# Patient Record
Sex: FEMALE | Race: WHITE | URBAN - METROPOLITAN AREA
[De-identification: names, ages, dates, MRNs, and addresses within clinical notes are randomized per-mention and may not be internally consistent; named-entity substitution may affect disease eponyms.]

---

## 2020-09-17 ENCOUNTER — HOSPITAL ENCOUNTER (OUTPATIENT)
Age: 40
Setting detail: SPECIMEN
Discharge: HOME | End: 2020-09-17
Payer: COMMERCIAL

## 2020-09-17 LAB
GLUCOSE ADMINISTRATION: NORMAL
GLUCOSE TOLERANCE SCREEN 50G: 121 MG/DL (ref 70–135)
HCT VFR BLD CALC: 34.4 % (ref 36.3–47.1)
HEMOGLOBIN: 11.2 G/DL (ref 11.9–15.1)
MCH RBC QN AUTO: 29.4 PG (ref 25.2–33.5)
MCHC RBC AUTO-ENTMCNC: 32.6 G/DL (ref 28.4–34.8)
MCV RBC AUTO: 90.3 FL (ref 82.6–102.9)
NRBC AUTOMATED: 0 PER 100 WBC
PDW BLD-RTO: 13.4 % (ref 11.8–14.4)
PLATELET # BLD: 160 K/UL (ref 138–453)
PMV BLD AUTO: 11.2 FL (ref 8.1–13.5)
RBC # BLD: 3.81 M/UL (ref 3.95–5.11)
WBC # BLD: 8.6 K/UL (ref 3.5–11.3)

## 2020-09-25 ENCOUNTER — HOSPITAL ENCOUNTER (OUTPATIENT)
Age: 40
Setting detail: SPECIMEN
Discharge: HOME | End: 2020-09-25
Payer: COMMERCIAL

## 2020-09-25 LAB — ANTIBODY SCREEN: NEGATIVE

## 2023-08-02 ENCOUNTER — OFFICE VISIT (OUTPATIENT)
Dept: PRIMARY CARE CLINIC | Age: 43
End: 2023-08-02
Payer: COMMERCIAL

## 2023-08-02 VITALS
OXYGEN SATURATION: 98 % | WEIGHT: 133 LBS | HEART RATE: 81 BPM | HEIGHT: 66 IN | DIASTOLIC BLOOD PRESSURE: 84 MMHG | SYSTOLIC BLOOD PRESSURE: 134 MMHG | RESPIRATION RATE: 10 BRPM | BODY MASS INDEX: 21.38 KG/M2

## 2023-08-02 DIAGNOSIS — Z13.220 SCREENING FOR LIPID DISORDERS: ICD-10-CM

## 2023-08-02 DIAGNOSIS — Z00.00 ENCOUNTER FOR GENERAL ADULT MEDICAL EXAMINATION W/O ABNORMAL FINDINGS: Primary | ICD-10-CM

## 2023-08-02 DIAGNOSIS — Z13.29 SCREENING FOR THYROID DISORDER: ICD-10-CM

## 2023-08-02 DIAGNOSIS — Z13.1 SCREENING FOR DIABETES MELLITUS: ICD-10-CM

## 2023-08-02 DIAGNOSIS — Z13.0 SCREENING FOR DEFICIENCY ANEMIA: ICD-10-CM

## 2023-08-02 PROCEDURE — 99386 PREV VISIT NEW AGE 40-64: CPT | Performed by: NURSE PRACTITIONER

## 2023-08-02 SDOH — ECONOMIC STABILITY: FOOD INSECURITY: WITHIN THE PAST 12 MONTHS, THE FOOD YOU BOUGHT JUST DIDN'T LAST AND YOU DIDN'T HAVE MONEY TO GET MORE.: NEVER TRUE

## 2023-08-02 SDOH — ECONOMIC STABILITY: HOUSING INSECURITY
IN THE LAST 12 MONTHS, WAS THERE A TIME WHEN YOU DID NOT HAVE A STEADY PLACE TO SLEEP OR SLEPT IN A SHELTER (INCLUDING NOW)?: NO

## 2023-08-02 SDOH — ECONOMIC STABILITY: INCOME INSECURITY: HOW HARD IS IT FOR YOU TO PAY FOR THE VERY BASICS LIKE FOOD, HOUSING, MEDICAL CARE, AND HEATING?: NOT HARD AT ALL

## 2023-08-02 SDOH — ECONOMIC STABILITY: FOOD INSECURITY: WITHIN THE PAST 12 MONTHS, YOU WORRIED THAT YOUR FOOD WOULD RUN OUT BEFORE YOU GOT MONEY TO BUY MORE.: NEVER TRUE

## 2023-08-02 ASSESSMENT — PATIENT HEALTH QUESTIONNAIRE - PHQ9
2. FEELING DOWN, DEPRESSED OR HOPELESS: 0
SUM OF ALL RESPONSES TO PHQ QUESTIONS 1-9: 0
SUM OF ALL RESPONSES TO PHQ QUESTIONS 1-9: 0
1. LITTLE INTEREST OR PLEASURE IN DOING THINGS: 0
SUM OF ALL RESPONSES TO PHQ QUESTIONS 1-9: 0
SUM OF ALL RESPONSES TO PHQ QUESTIONS 1-9: 0
SUM OF ALL RESPONSES TO PHQ9 QUESTIONS 1 & 2: 0

## 2023-08-02 ASSESSMENT — ENCOUNTER SYMPTOMS
ABDOMINAL PAIN: 0
COUGH: 0
SHORTNESS OF BREATH: 0
BACK PAIN: 0

## 2023-08-02 NOTE — PROGRESS NOTES
[address]  PRIMARY CARE  [place] [id number] [address] [address]  Dept: [phone]  Dept Fax: [phone]    Toni Cadena is a 38 y.o. female who presentstoday for her medical conditions/complaints as noted below. Toni Cadena is c/o of  Chief Complaint   Patient presents with    Establish Care           Blood Pressure Check     BP is higher than normal            HPI:     Presents for new patient visit/est care  BP well controlled in office  Monitoring at home with arm cuff, 130s/80s  Will continue to monitor. Asymptomatic with readings  BMI well controlled  Has two sons, ages 11 and 2  Had to monitor BP during pregnancy but did not use medication  Was induced early for second pregnancy d/t elevated BP    Follows with GYN  Will schedule annual for pap and mammogram    Willing to update annual labs    Denies any other problems/concerns      No results found for: LABA1C          ( goal A1C is < 7)   No components found for: LABMICR  No results found for: LDLCHOLESTEROL, LDLCALC    (goal LDL is <100)   No results found for: AST, ALT, BUN, CR  BP Readings from Last 3 Encounters:   08/02/23 134/84          ([id number])    History reviewed. No pertinent past medical history. History reviewed. No pertinent surgical history. Family History   Problem Relation Age of Onset    High Blood Pressure Mother     High Blood Pressure Father           Social History     Tobacco Use    Smoking status: Never     Passive exposure: Never    Smokeless tobacco: Never   Substance Use Topics    Alcohol use: Never      Current Outpatient Medications   Medication Sig Dispense Refill    Multiple Vitamin (MULTIVITAMIN ADULT PO) Take by mouth       No current facility-administered medications for this visit.      No Known Allergies    Health Maintenance   Topic Date Due    HIV screen  Never done    Hepatitis C screen  Never done    Cervical cancer screen  Never done    COVID-19 Vaccine (3 -

## 2025-02-05 ENCOUNTER — OFFICE VISIT (OUTPATIENT)
Dept: PRIMARY CARE CLINIC | Age: 45
End: 2025-02-05
Payer: COMMERCIAL

## 2025-02-05 ENCOUNTER — HOSPITAL ENCOUNTER (OUTPATIENT)
Age: 45
Setting detail: SPECIMEN
Discharge: HOME | End: 2025-02-05

## 2025-02-05 VITALS
BODY MASS INDEX: 22.5 KG/M2 | HEART RATE: 108 BPM | DIASTOLIC BLOOD PRESSURE: 92 MMHG | HEIGHT: 66 IN | OXYGEN SATURATION: 99 % | WEIGHT: 140 LBS | SYSTOLIC BLOOD PRESSURE: 162 MMHG

## 2025-02-05 DIAGNOSIS — I10 ESSENTIAL HYPERTENSION: Primary | ICD-10-CM

## 2025-02-05 DIAGNOSIS — Z87.59 HISTORY OF GESTATIONAL HYPERTENSION: ICD-10-CM

## 2025-02-05 DIAGNOSIS — Z78.9 NEVER SMOKED TOBACCO: ICD-10-CM

## 2025-02-05 DIAGNOSIS — I10 ESSENTIAL HYPERTENSION: ICD-10-CM

## 2025-02-05 PROBLEM — R03.0 ELEVATED BP WITHOUT DIAGNOSIS OF HYPERTENSION: Status: ACTIVE | Noted: 2025-02-05

## 2025-02-05 LAB
ANION GAP SERPL CALCULATED.3IONS-SCNC: 10 MMOL/L (ref 9–16)
BUN SERPL-MCNC: 12 MG/DL (ref 6–20)
CALCIUM SERPL-MCNC: 9.5 MG/DL (ref 8.6–10.4)
CHLORIDE SERPL-SCNC: 101 MMOL/L (ref 98–107)
CHOLEST SERPL-MCNC: 205 MG/DL (ref 0–199)
CHOLESTEROL/HDL RATIO: 2.6
CO2 SERPL-SCNC: 29 MMOL/L (ref 20–31)
CREAT SERPL-MCNC: 0.6 MG/DL (ref 0.6–0.9)
CREAT UR-MCNC: 79.1 MG/DL (ref 28–217)
ERYTHROCYTE [DISTWIDTH] IN BLOOD BY AUTOMATED COUNT: 15.6 % (ref 11.8–14.4)
GFR, ESTIMATED: >90 ML/MIN/1.73M2
GLUCOSE SERPL-MCNC: 97 MG/DL (ref 74–99)
HCT VFR BLD AUTO: 40.8 % (ref 36.3–47.1)
HDLC SERPL-MCNC: 79 MG/DL
HGB BLD-MCNC: 12.3 G/DL (ref 11.9–15.1)
LDLC SERPL CALC-MCNC: 103 MG/DL (ref 0–100)
MCH RBC QN AUTO: 23.7 PG (ref 25.2–33.5)
MCHC RBC AUTO-ENTMCNC: 30.1 G/DL (ref 28.4–34.8)
MCV RBC AUTO: 78.5 FL (ref 82.6–102.9)
MICROALBUMIN UR-MCNC: <12 MG/L (ref 0–20)
MICROALBUMIN/CREAT UR-RTO: NORMAL MCG/MG CREAT (ref 0–25)
NRBC BLD-RTO: 0 PER 100 WBC
PLATELET # BLD AUTO: 227 K/UL (ref 138–453)
PMV BLD AUTO: 11.1 FL (ref 8.1–13.5)
POTASSIUM SERPL-SCNC: 3.6 MMOL/L (ref 3.7–5.3)
RBC # BLD AUTO: 5.2 M/UL (ref 3.95–5.11)
SODIUM SERPL-SCNC: 140 MMOL/L (ref 136–145)
TRIGL SERPL-MCNC: 117 MG/DL (ref 0–149)
VLDLC SERPL CALC-MCNC: 23 MG/DL (ref 1–30)
WBC OTHER # BLD: 6 K/UL (ref 3.5–11.3)

## 2025-02-05 PROCEDURE — 3077F SYST BP >= 140 MM HG: CPT | Performed by: STUDENT IN AN ORGANIZED HEALTH CARE EDUCATION/TRAINING PROGRAM

## 2025-02-05 PROCEDURE — 99204 OFFICE O/P NEW MOD 45 MIN: CPT | Performed by: STUDENT IN AN ORGANIZED HEALTH CARE EDUCATION/TRAINING PROGRAM

## 2025-02-05 PROCEDURE — 3079F DIAST BP 80-89 MM HG: CPT | Performed by: STUDENT IN AN ORGANIZED HEALTH CARE EDUCATION/TRAINING PROGRAM

## 2025-02-05 PROCEDURE — 93000 ELECTROCARDIOGRAM COMPLETE: CPT | Performed by: STUDENT IN AN ORGANIZED HEALTH CARE EDUCATION/TRAINING PROGRAM

## 2025-02-05 RX ORDER — VALSARTAN 80 MG/1
80 TABLET ORAL DAILY
Qty: 30 TABLET | Refills: 0 | Status: SHIPPED | OUTPATIENT
Start: 2025-02-05

## 2025-02-05 RX ORDER — AMLODIPINE BESYLATE 5 MG/1
5 TABLET ORAL DAILY
Qty: 30 TABLET | Refills: 0 | Status: SHIPPED | OUTPATIENT
Start: 2025-02-05

## 2025-02-05 SDOH — ECONOMIC STABILITY: FOOD INSECURITY: WITHIN THE PAST 12 MONTHS, THE FOOD YOU BOUGHT JUST DIDN'T LAST AND YOU DIDN'T HAVE MONEY TO GET MORE.: NEVER TRUE

## 2025-02-05 SDOH — ECONOMIC STABILITY: FOOD INSECURITY: WITHIN THE PAST 12 MONTHS, YOU WORRIED THAT YOUR FOOD WOULD RUN OUT BEFORE YOU GOT MONEY TO BUY MORE.: NEVER TRUE

## 2025-02-05 ASSESSMENT — PATIENT HEALTH QUESTIONNAIRE - PHQ9
2. FEELING DOWN, DEPRESSED OR HOPELESS: NOT AT ALL
SUM OF ALL RESPONSES TO PHQ QUESTIONS 1-9: 0
1. LITTLE INTEREST OR PLEASURE IN DOING THINGS: NOT AT ALL
SUM OF ALL RESPONSES TO PHQ QUESTIONS 1-9: 0
SUM OF ALL RESPONSES TO PHQ9 QUESTIONS 1 & 2: 0

## 2025-02-05 NOTE — ASSESSMENT & PLAN NOTE
- EKG normal  - Prescribed new blood pressure cuff  - Advised to monitor blood pressure at home 3 times a week and maintain a log  - HTN lab panel ordered today  - Instructed to bring blood pressure log and cuff to next appointment for accuracy verification  - started valsartan, amlodipine today

## 2025-02-05 NOTE — PATIENT INSTRUCTIONS
If you plan on taking your medications at night, you can take one tablet of Valsartan as soon as you  the prescription. Then you can take both the amlodipine  and valsartan at night.     If you plan on taking the meds in the morning, just take both as soon as you get the prescriptions.     Hypertension  If you've been asked to keep track of your blood pressure at home, make sure you are measuring your blood pressure at the same time of day every day you measure it. Blood pressure follows a natural variation every day; it starts off low, rises throughout the day until midday, and then starts to drop again in the late afternoon/evening. It doesn't matter when you measure your blood pressure as long as you measure it at the same time of day each time, so take that into consideration when trying to determine when you want to check your blood pressure.    If you're looking for a new blood pressure cuff, check out the website validateBP.org to find a quality cuff    Some things you can do to reduce your high blood pressure:  Reach and maintain a healthy weight. Every little bit counts; a 5-10% drop in your weight can result in improvement in your blood pressure.  Eat a heart-healthy diet that includes vegetables, fruits, whole grains, low-fat dairy products, poultry, fish, legumes, non-tropical vegetable oils and nuts. Your diet should also limit sodium, sweets, saturated fats, sugar sweetened beverages and red meats. For more information, you can look into the Mediterranean Diet and the DASH (dietary approaches to stop hypertension) Diet.  Be more physically active. Aim for 150 minutes of moderate exercise (with moderate exercise, you should be able to carry a conversation but not able to sing a song).  Don’t smoke and avoid secondhand smoke.  Limit alcohol to no more than one drink per day for women or two drinks a day for men.  Take your medication as prescribed.  Know what your blood pressure should be and work

## 2025-02-05 NOTE — PROGRESS NOTES
[facility]  [address] [place] [address].  [place] [address]  Phone: [phone]  Fax: [phone]    Office Visit Note    Date of Visit: 2025   Patient Name: Carleen Oh   Patient :  1980     ASSESSMENT/PLAN   Nothing special needed  1. Elevated BP without diagnosis of hypertension    Assessment & Plan      There are no Patient Instructions on file for this visit.     No follow-ups on file.    COMMUNICATION   Questions/concerns answered. Patient verbalized and expressed understanding. Medications, laboratory testing, imaging, consultation, and follow up as documented in this encounter.     The patient (or guardian, if applicable) and other individuals in attendance with the patient were advised that Artificial Intelligence will be utilized during this visit to record, process the conversation to generate a clinical note and to support improvement of the AI technology. The patient (or guardian, if applicable) and other individuals in attendance at the appointment consented to the use of AI, including the recording.      An electronic signature was used to authenticate this note  - signed by Catherine Almendarez MD on 2025 at 7:05 AM     HPI    Carleen Oh is a 44 y.o. female who presents today to discuss No chief complaint on file.  Patient is here to establish care.   New patient background information: ***  Last PCP: ***  Last full physical:   Does patient see any specialists?: ***  Chronic Medical Conditions: ***  OARRS report reviewed: Reviewed-no patient matching search criteria  Records reviewed from: 2023 PCP note, 2023 labs (CBC, CMP),  History of Present Illness      REVIEW OF SYSTEM    As noted above in HPI.    PHYSICAL EXAM   There were no vitals taken for this visit.     Physical Exam    Physical Exam   LABS     No visits with results within 3 Month(s) from this visit.   Latest known visit with results is:   Hospital Outpatient Visit on 2020   Component Date Value

## 2025-02-05 NOTE — PROGRESS NOTES
[facility]  [address] [place] [address].  [place] [address]  Phone: [phone]  Fax: [phone]    Office Visit Note    Date of Visit: 2025   Patient Name: Carleen Oh   Patient :  1980     ASSESSMENT/PLAN     1. Essential hypertension  Overview:  25 EKG: NSR, vent rate ~95bpm. No abnormalities.   Assessment & Plan:  - EKG normal  - Prescribed new blood pressure cuff  - Advised to monitor blood pressure at home 3 times a week and maintain a log  - HTN lab panel ordered today  - Instructed to bring blood pressure log and cuff to next appointment for accuracy verification  - started valsartan, amlodipine today  Orders:  -     Basic Metabolic Panel; Future  -     Lipid, Fasting; Future  -     CBC; Future  -     Albumin/Creatinine Ratio, Urine; Future  -     EKG 12 Lead  -     amLODIPine (NORVASC) 5 MG tablet; Take 1 tablet by mouth daily, Disp-30 tablet, R-0Normal  -     valsartan (DIOVAN) 80 MG tablet; Take 1 tablet by mouth daily, Disp-30 tablet, R-0Normal  2. History of gestational hypertension  -     Basic Metabolic Panel; Future  -     Lipid, Fasting; Future  -     CBC; Future  -     Albumin/Creatinine Ratio, Urine; Future  3. Never smoked tobacco  4. BMI 22.0-22.9, adult      Patient Instructions   If you plan on taking your medications at night, you can take one tablet of Valsartan as soon as you  the prescription. Then you can take both the amlodipine  and valsartan at night.     If you plan on taking the meds in the morning, just take both as soon as you get the prescriptions.     Hypertension  If you've been asked to keep track of your blood pressure at home, make sure you are measuring your blood pressure at the same time of day every day you measure it. Blood pressure follows a natural variation every day; it starts off low, rises throughout the day until midday, and then starts to drop again in the late afternoon/evening. It doesn't matter when you measure your blood

## 2025-02-07 ENCOUNTER — PATIENT MESSAGE (OUTPATIENT)
Dept: PRIMARY CARE CLINIC | Age: 45
End: 2025-02-07

## 2025-02-07 DIAGNOSIS — I10 ESSENTIAL HYPERTENSION: Primary | ICD-10-CM

## 2025-02-20 RX ORDER — NIFEDIPINE 30 MG/1
30 TABLET, EXTENDED RELEASE ORAL DAILY
Qty: 30 TABLET | Refills: 0 | Status: SHIPPED | OUTPATIENT
Start: 2025-02-20 | End: 2025-02-21

## 2025-02-21 RX ORDER — NIFEDIPINE 30 MG/1
30 TABLET, EXTENDED RELEASE ORAL DAILY
Qty: 30 TABLET | Refills: 0 | Status: SHIPPED | OUTPATIENT
Start: 2025-02-21

## 2025-02-28 DIAGNOSIS — I10 ESSENTIAL HYPERTENSION: ICD-10-CM

## 2025-02-28 NOTE — TELEPHONE ENCOUNTER
Pharmacy comment: REQUEST FOR 90 DAYS PRESCRIPTION. DX Code Needed.   LAST VISIT:   2/5/2025     No future appointments.

## 2025-03-04 RX ORDER — VALSARTAN 80 MG/1
80 TABLET ORAL DAILY
Qty: 90 TABLET | Refills: 1 | OUTPATIENT
Start: 2025-03-04

## 2025-03-05 DIAGNOSIS — I10 ESSENTIAL HYPERTENSION: ICD-10-CM

## 2025-03-05 NOTE — TELEPHONE ENCOUNTER
Carleen Oh is requesting a refill on the following medication(s):  Requested Prescriptions     Pending Prescriptions Disp Refills    valsartan (DIOVAN) 80 MG tablet [Pharmacy Med Name: VALSARTAN 80 MG TABLET] 30 tablet 0     Sig: TAKE 1 TABLET BY MOUTH EVERY DAY       Last Visit Date (If Applicable):  2/5/2025    Next Visit Date:    Visit date not found

## 2025-03-11 RX ORDER — AMLODIPINE BESYLATE 5 MG/1
5 TABLET ORAL DAILY
Qty: 90 TABLET | Refills: 0 | Status: SHIPPED | OUTPATIENT
Start: 2025-03-11 | End: 2025-03-13

## 2025-03-11 RX ORDER — VALSARTAN 80 MG/1
80 TABLET ORAL DAILY
Qty: 30 TABLET | Refills: 0 | OUTPATIENT
Start: 2025-03-11

## 2025-03-12 NOTE — TELEPHONE ENCOUNTER
Patient called very confused stating that when she was in last she thought that she was only to take the NIFEdipine?    She is asking for a call back to clarify, says ok to leave message if she doesn't answer    Please advise

## 2025-03-13 DIAGNOSIS — I10 ESSENTIAL HYPERTENSION: ICD-10-CM

## 2025-03-13 RX ORDER — NIFEDIPINE 30 MG/1
30 TABLET, EXTENDED RELEASE ORAL DAILY
Qty: 30 TABLET | Refills: 0 | Status: SHIPPED | OUTPATIENT
Start: 2025-03-13 | End: 2025-03-13

## 2025-03-13 RX ORDER — NIFEDIPINE 30 MG/1
30 TABLET, EXTENDED RELEASE ORAL DAILY
Qty: 90 TABLET | Refills: 0 | Status: SHIPPED | OUTPATIENT
Start: 2025-03-13

## 2025-03-13 NOTE — TELEPHONE ENCOUNTER
Not sure how amlodipine ended up on her med list again, but its been d/c'd and nifedipine prescription was sent to pharmacy.    Called and left vm as requested.

## 2025-03-20 ENCOUNTER — HOSPITAL ENCOUNTER (OUTPATIENT)
Age: 45
Setting detail: SPECIMEN
Discharge: HOME | End: 2025-03-20

## 2025-03-20 ENCOUNTER — RESULTS FOLLOW-UP (OUTPATIENT)
Dept: PRIMARY CARE CLINIC | Age: 45
End: 2025-03-20

## 2025-03-20 ENCOUNTER — OFFICE VISIT (OUTPATIENT)
Dept: PRIMARY CARE CLINIC | Age: 45
End: 2025-03-20
Payer: COMMERCIAL

## 2025-03-20 VITALS
HEIGHT: 66 IN | OXYGEN SATURATION: 98 % | BODY MASS INDEX: 22.82 KG/M2 | HEART RATE: 83 BPM | SYSTOLIC BLOOD PRESSURE: 132 MMHG | DIASTOLIC BLOOD PRESSURE: 86 MMHG | WEIGHT: 142 LBS

## 2025-03-20 DIAGNOSIS — E87.6 HYPOKALEMIA: ICD-10-CM

## 2025-03-20 DIAGNOSIS — E61.1 IRON DEFICIENCY: Primary | ICD-10-CM

## 2025-03-20 DIAGNOSIS — R71.8 MICROCYTOSIS: ICD-10-CM

## 2025-03-20 DIAGNOSIS — Z78.9 NEVER SMOKED TOBACCO: ICD-10-CM

## 2025-03-20 DIAGNOSIS — I10 ESSENTIAL HYPERTENSION: ICD-10-CM

## 2025-03-20 DIAGNOSIS — I10 ESSENTIAL HYPERTENSION: Primary | ICD-10-CM

## 2025-03-20 LAB
ANION GAP SERPL CALCULATED.3IONS-SCNC: 11 MMOL/L (ref 9–16)
BUN SERPL-MCNC: 10 MG/DL (ref 6–20)
CALCIUM SERPL-MCNC: 9.7 MG/DL (ref 8.6–10.4)
CHLORIDE SERPL-SCNC: 105 MMOL/L (ref 98–107)
CO2 SERPL-SCNC: 26 MMOL/L (ref 20–31)
CREAT SERPL-MCNC: 0.7 MG/DL (ref 0.6–0.9)
FERRITIN SERPL-MCNC: 7 NG/ML (ref 15–150)
GFR, ESTIMATED: >90 ML/MIN/1.73M2
GLUCOSE SERPL-MCNC: 82 MG/DL (ref 74–99)
IRON SATN MFR SERPL: 9 % (ref 20–55)
IRON SERPL-MCNC: 39 UG/DL (ref 37–145)
POTASSIUM SERPL-SCNC: 4.6 MMOL/L (ref 3.7–5.3)
SODIUM SERPL-SCNC: 142 MMOL/L (ref 136–145)
TIBC SERPL-MCNC: 435 UG/DL (ref 250–450)
UNSATURATED IRON BINDING CAPACITY: 396 UG/DL (ref 112–347)

## 2025-03-20 PROCEDURE — 99214 OFFICE O/P EST MOD 30 MIN: CPT | Performed by: STUDENT IN AN ORGANIZED HEALTH CARE EDUCATION/TRAINING PROGRAM

## 2025-03-20 PROCEDURE — 3075F SYST BP GE 130 - 139MM HG: CPT | Performed by: STUDENT IN AN ORGANIZED HEALTH CARE EDUCATION/TRAINING PROGRAM

## 2025-03-20 PROCEDURE — 3079F DIAST BP 80-89 MM HG: CPT | Performed by: STUDENT IN AN ORGANIZED HEALTH CARE EDUCATION/TRAINING PROGRAM

## 2025-03-20 RX ORDER — NIFEDIPINE 60 MG/1
60 TABLET, EXTENDED RELEASE ORAL DAILY
Qty: 30 TABLET | Refills: 0 | Status: SHIPPED | OUTPATIENT
Start: 2025-03-20

## 2025-03-20 RX ORDER — FERROUS SULFATE 325(65) MG
325 TABLET, DELAYED RELEASE (ENTERIC COATED) ORAL EVERY OTHER DAY
Qty: 45 TABLET | Refills: 0 | Status: SHIPPED | OUTPATIENT
Start: 2025-03-20 | End: 2025-06-18

## 2025-03-20 NOTE — PROGRESS NOTES
generate a clinical note and to support improvement of the AI technology. The patient (or guardian, if applicable) and other individuals in attendance at the appointment consented to the use of AI, including the recording.      An electronic signature was used to authenticate this note  - signed by Catherine Almendarez MD on 3/20/2025 at 10:31 AM     [facility]    Carleen Oh is a 44 y.o. female who presents today to discuss   Chief Complaint   Patient presents with    Hypertension     Check up, Patient states she was doing well with valsartan/amlodipine,    Other     KATHIE used at last visit     History of Present Illness  The patient is a 44-year-old female presenting for a follow-up evaluation of her hypertension.    Hypertension  - The patient reports monitoring her blood pressure at home between 0500 and 0800 hours daily.  - She initially experienced fatigue with the administration of amlodipine and valsartan, which had resolved before switching to nifedipine  - She is currently taking nifedipine 30 mg daily without experiencing any adverse effects.    Supplemental information: She denies any gastrointestinal disturbances. Additionally, the patient takes a daily generic multivitamin and previously took iron supplements during pregnancy.    REVIEW OF SYSTEM    As noted above in HPI.    PHYSICAL EXAM   /86   Pulse 83   Ht 1.676 m (5' 6\")   Wt 64.4 kg (142 lb)   SpO2 98%   BMI 22.92 kg/m²      Physical Exam  Vitals and nursing note reviewed.   Constitutional:       General: She is not in acute distress.     Appearance: Normal appearance.   HENT:      Head: Normocephalic and atraumatic.   Cardiovascular:      Rate and Rhythm: Normal rate and regular rhythm.      Heart sounds: Normal heart sounds. No murmur heard.  Pulmonary:      Effort: Pulmonary effort is normal. No respiratory distress.      Breath sounds: Normal breath sounds.   Musculoskeletal:      Right lower leg: No edema.      Left lower leg: No edema.

## 2025-03-20 NOTE — ASSESSMENT & PLAN NOTE
- microcytosis without anemia appears to be stable finding  - Conduct iron panel today  - May advise women's multivitamin with iron or iron supplement every other day based on results

## 2025-03-20 NOTE — ASSESSMENT & PLAN NOTE
- Prescribed BP cuff at last appt  - on nifedipine 30mg qd  - BP log scanned into chart, indicates improved control but DBP still elevated  - uACR, renal function wnl  - increase nifedipine to 60mg- if MICHELLE experienced, discussed labetalol as possible secondary antihypertensive

## 2025-04-15 DIAGNOSIS — R71.8 MICROCYTOSIS: ICD-10-CM

## 2025-04-15 DIAGNOSIS — I10 ESSENTIAL HYPERTENSION: Primary | ICD-10-CM

## 2025-04-15 RX ORDER — NIFEDIPINE 60 MG/1
60 TABLET, EXTENDED RELEASE ORAL DAILY
Qty: 90 TABLET | Refills: 1 | Status: SHIPPED | OUTPATIENT
Start: 2025-04-15

## 2025-04-15 NOTE — TELEPHONE ENCOUNTER
Pharmacy comment: REQUEST FOR 90 DAYS PRESCRIPTION. DX Code Needed.   LAST VISIT:   3/20/2025     No future appointments.

## 2025-04-29 ENCOUNTER — OFFICE VISIT (OUTPATIENT)
Dept: PRIMARY CARE CLINIC | Age: 45
End: 2025-04-29
Payer: COMMERCIAL

## 2025-04-29 VITALS
OXYGEN SATURATION: 98 % | BODY MASS INDEX: 21.86 KG/M2 | DIASTOLIC BLOOD PRESSURE: 86 MMHG | SYSTOLIC BLOOD PRESSURE: 124 MMHG | HEIGHT: 66 IN | WEIGHT: 136 LBS | HEART RATE: 99 BPM

## 2025-04-29 DIAGNOSIS — Z78.9 NEVER SMOKED TOBACCO: ICD-10-CM

## 2025-04-29 DIAGNOSIS — I10 ESSENTIAL HYPERTENSION: ICD-10-CM

## 2025-04-29 DIAGNOSIS — Z00.00 ENCOUNTER FOR WELL ADULT EXAM WITHOUT ABNORMAL FINDINGS: Primary | ICD-10-CM

## 2025-04-29 PROCEDURE — 99396 PREV VISIT EST AGE 40-64: CPT | Performed by: STUDENT IN AN ORGANIZED HEALTH CARE EDUCATION/TRAINING PROGRAM

## 2025-04-29 PROCEDURE — 3079F DIAST BP 80-89 MM HG: CPT | Performed by: STUDENT IN AN ORGANIZED HEALTH CARE EDUCATION/TRAINING PROGRAM

## 2025-04-29 PROCEDURE — 99214 OFFICE O/P EST MOD 30 MIN: CPT | Performed by: STUDENT IN AN ORGANIZED HEALTH CARE EDUCATION/TRAINING PROGRAM

## 2025-04-29 PROCEDURE — 3074F SYST BP LT 130 MM HG: CPT | Performed by: STUDENT IN AN ORGANIZED HEALTH CARE EDUCATION/TRAINING PROGRAM

## 2025-04-29 RX ORDER — LABETALOL 100 MG/1
50 TABLET, FILM COATED ORAL 2 TIMES DAILY
Qty: 60 TABLET | Refills: 0 | Status: SHIPPED | OUTPATIENT
Start: 2025-04-29 | End: 2025-06-28

## 2025-04-29 NOTE — PROGRESS NOTES
[facility]  [address] [place] [address].  [place] [address]  Phone: [phone]  Fax: [phone]    Date of Visit: 2025   Patient Name: Carleen Oh   Patient :  1980     ASSESSMENT/PLAN     Carleen Oh is a 44 y.o. female who is here for a complete physical exam. Preventative screenings reviewed with patient today.     1. Encounter for well adult exam without abnormal findings  Comments:  - Advised annual COVID-19 vaccine, similar to influenza vaccine, can be done at local pharmacy  - Encouraged to discuss hPOA and living will with next of kin  2. Essential hypertension  Overview:  25 EKG: NSR, vent rate ~95bpm. No abnormalities.   Currently on nifedipine (need antihypertensive safe in pregnancy), currently managed by PCP.  Assessment & Plan:  - Inconsistent blood pressure readings, some diastolic values in 80s  - On nifedipine, occasional leg swelling  - Prescribed labetalol 100 mg tablets, 50 mg twice daily  - Continue monitoring blood pressure three times weekly, record pulse rate  - Report pulse rates <60 bpm or fatigue  - Provided labetalol information handout  - Advised to wear compression socks  - If labetalol ineffective, consider hydrochlorothiazide (discussed with pt that HCTZ would need to be stopped if she became pregnant)  Orders:  -     labetalol (NORMODYNE) 100 MG tablet; Take 0.5 tablets by mouth 2 times daily, Disp-60 tablet, R-0Normal  3. BMI 21.0-21.9, adult  4. Never smoked tobacco       Patient Instructions        Well Visit, Ages 18 to 65: Care Instructions  Well visits can help you stay healthy. Your doctor has checked your overall health and may have suggested ways to take good care of yourself. Your doctor also may have recommended tests. You can help prevent illness with healthy eating, good sleep, vaccinations, regular exercise, and other steps.    Get the tests that you and your doctor decide on. Depending on your age and risks, examples might include

## 2025-05-08 NOTE — ASSESSMENT & PLAN NOTE
- Inconsistent blood pressure readings, some diastolic values in 80s  - On nifedipine, occasional leg swelling  - Prescribed labetalol 100 mg tablets, 50 mg twice daily  - Continue monitoring blood pressure three times weekly, record pulse rate  - Report pulse rates <60 bpm or fatigue  - Provided labetalol information handout  - Advised to wear compression socks  - If labetalol ineffective, consider hydrochlorothiazide (discussed with pt that HCTZ would need to be stopped if she became pregnant)

## 2025-05-28 DIAGNOSIS — I10 ESSENTIAL HYPERTENSION: ICD-10-CM

## 2025-05-30 RX ORDER — LABETALOL 100 MG/1
TABLET, FILM COATED ORAL
Qty: 30 TABLET | Refills: 0 | Status: SHIPPED | OUTPATIENT
Start: 2025-05-30 | End: 2025-06-26

## 2025-06-16 RX ORDER — NIFEDIPINE 30 MG/1
30 TABLET, EXTENDED RELEASE ORAL DAILY
Qty: 90 TABLET | OUTPATIENT
Start: 2025-06-16

## 2025-06-24 DIAGNOSIS — I10 ESSENTIAL HYPERTENSION: ICD-10-CM

## 2025-06-26 RX ORDER — LABETALOL 100 MG/1
TABLET, FILM COATED ORAL
Qty: 90 TABLET | Refills: 0 | Status: SHIPPED | OUTPATIENT
Start: 2025-06-26

## 2025-07-16 ENCOUNTER — OFFICE VISIT (OUTPATIENT)
Dept: PRIMARY CARE CLINIC | Age: 45
End: 2025-07-16
Payer: COMMERCIAL

## 2025-07-16 VITALS
HEART RATE: 84 BPM | BODY MASS INDEX: 22.34 KG/M2 | OXYGEN SATURATION: 98 % | HEIGHT: 66 IN | SYSTOLIC BLOOD PRESSURE: 120 MMHG | WEIGHT: 139 LBS | DIASTOLIC BLOOD PRESSURE: 84 MMHG

## 2025-07-16 DIAGNOSIS — Z78.9 NEVER SMOKED TOBACCO: ICD-10-CM

## 2025-07-16 DIAGNOSIS — I10 ESSENTIAL HYPERTENSION: Primary | ICD-10-CM

## 2025-07-16 PROCEDURE — 3074F SYST BP LT 130 MM HG: CPT | Performed by: STUDENT IN AN ORGANIZED HEALTH CARE EDUCATION/TRAINING PROGRAM

## 2025-07-16 PROCEDURE — 99213 OFFICE O/P EST LOW 20 MIN: CPT | Performed by: STUDENT IN AN ORGANIZED HEALTH CARE EDUCATION/TRAINING PROGRAM

## 2025-07-16 PROCEDURE — 3079F DIAST BP 80-89 MM HG: CPT | Performed by: STUDENT IN AN ORGANIZED HEALTH CARE EDUCATION/TRAINING PROGRAM

## 2025-07-16 RX ORDER — NIFEDIPINE 60 MG/1
60 TABLET, EXTENDED RELEASE ORAL DAILY
Qty: 90 TABLET | Refills: 1 | Status: SHIPPED | OUTPATIENT
Start: 2025-07-16

## 2025-07-16 RX ORDER — LABETALOL 100 MG/1
50 TABLET, FILM COATED ORAL 2 TIMES DAILY
Qty: 90 TABLET | Refills: 0 | Status: SHIPPED | OUTPATIENT
Start: 2025-07-16 | End: 2025-07-16

## 2025-07-16 RX ORDER — LABETALOL 100 MG/1
50 TABLET, FILM COATED ORAL 2 TIMES DAILY
Qty: 90 TABLET | Refills: 1 | Status: SHIPPED | OUTPATIENT
Start: 2025-07-16 | End: 2026-01-12

## 2025-07-16 NOTE — PROGRESS NOTES
[facility]  [address] [place] [address].  [place] [address]  Phone: [phone]  Fax: [phone]    Office Visit Note    Date of Visit: 2025   Patient Name: Carleen Oh   Patient :  1980     ASSESSMENT/PLAN     1. Essential hypertension  Overview:  25 EKG: NSR, vent rate ~95bpm. No abnormalities.   Currently on nifedipine and labetalol (need antihypertensive safe in pregnancy), currently managed by PCP.  Assessment & Plan:  - Office BP readings within normal range, indicating good control  - No chest pain or dyspnea; slight leg swelling likely due to prolonged standing  - Advised compression socks during winter  - Continue regular eye exams and inform eye doctor about hypertension  - Refill for labetalol 90 tablets with refill and Procardia (nifedipine) sent to pharmacy  - Monitor BP only with unusual symptoms  Orders:  -     labetalol (NORMODYNE) 100 MG tablet; Take 0.5 tablets by mouth 2 times daily, Disp-90 tablet, R-1Normal  -     NIFEdipine (PROCARDIA XL) 60 MG extended release tablet; Take 1 tablet by mouth daily, Disp-90 tablet, R-1Normal  2. Never smoked tobacco  3. BMI 22.0-22.9, adult      There are no Patient Instructions on file for this visit.     Return in about 6 months (around 2026) for HTN med recheck.    COMMUNICATION     Questions/concerns answered. Patient verbalized and expressed understanding. Medications, laboratory testing, imaging, consultation, and follow up as documented in this encounter.     The patient (or guardian, if applicable) and other individuals in attendance with the patient were advised that Artificial Intelligence will be utilized during this visit to record, process the conversation to generate a clinical note and to support improvement of the AI technology. The patient (or guardian, if applicable) and other individuals in attendance at the appointment consented to the use of AI, including the recording.      An electronic signature was used

## 2025-07-24 NOTE — ASSESSMENT & PLAN NOTE
- Office BP readings within normal range, indicating good control  - No chest pain or dyspnea; slight leg swelling likely due to prolonged standing  - Advised compression socks during winter  - Continue regular eye exams and inform eye doctor about hypertension  - Refill for labetalol 90 tablets with refill and Procardia (nifedipine) sent to pharmacy  - Monitor BP only with unusual symptoms